# Patient Record
Sex: FEMALE | Race: AMERICAN INDIAN OR ALASKA NATIVE
[De-identification: names, ages, dates, MRNs, and addresses within clinical notes are randomized per-mention and may not be internally consistent; named-entity substitution may affect disease eponyms.]

---

## 2017-05-03 ENCOUNTER — HOSPITAL ENCOUNTER (EMERGENCY)
Dept: HOSPITAL 5 - ED | Age: 32
LOS: 1 days | Discharge: HOME | End: 2017-05-04
Payer: COMMERCIAL

## 2017-05-03 DIAGNOSIS — G43.909: ICD-10-CM

## 2017-05-03 DIAGNOSIS — Z87.11: ICD-10-CM

## 2017-05-03 DIAGNOSIS — J45.909: ICD-10-CM

## 2017-05-03 DIAGNOSIS — R10.12: Primary | ICD-10-CM

## 2017-05-03 PROCEDURE — 74020: CPT

## 2017-05-03 PROCEDURE — 83690 ASSAY OF LIPASE: CPT

## 2017-05-03 PROCEDURE — C9113 INJ PANTOPRAZOLE SODIUM, VIA: HCPCS

## 2017-05-03 PROCEDURE — 80053 COMPREHEN METABOLIC PANEL: CPT

## 2017-05-03 PROCEDURE — 85025 COMPLETE CBC W/AUTO DIFF WBC: CPT

## 2017-05-03 PROCEDURE — 84703 CHORIONIC GONADOTROPIN ASSAY: CPT

## 2017-05-03 PROCEDURE — 36415 COLL VENOUS BLD VENIPUNCTURE: CPT

## 2017-05-03 PROCEDURE — 99284 EMERGENCY DEPT VISIT MOD MDM: CPT

## 2017-05-03 PROCEDURE — 96374 THER/PROPH/DIAG INJ IV PUSH: CPT

## 2017-05-03 NOTE — EMERGENCY DEPARTMENT REPORT
HPI





- General


Time Seen by Provider: 05/03/17 23:00





- HPI


HPI: 


This is a 32-year-old -American female presents to the emergency 

department with complaint of a four-day history of intermittent upper left 

abdominal discomfort.  It is associated with some occasional nausea without 

vomiting.  She has a history of a stomach ulcer that was diagnosed in 2014 by 

endoscopy.  She is supposed to be taking Protonix for this, and iron pills for 

a history of anemia, but has not been compliant with this medication.  The 

patient was having some constipation issues so she took some magnesium oxide 

and has just recently started moving her bowels.  She took some Pepto-Bismol on 

Monday.  She said within the bowel started to move and she had some diarrhea 

that the stool seemed darker.  Otherwise she has no obvious rectal bleeding.  

Otherwise she has a past nuchal history of asthma.  She goes to [facility].  When she was seen by gastroenterology and 2014 it was Dr. Acosta.








ED Past Medical Hx





- Past Medical History


Hx Headaches / Migraines: Yes


Hx Asthma: Yes (albuterol this am. no recent attacks)


Additional medical history: Syncope, Anemia, Ulcer





- Surgical History


Additional Surgical History: pilonidal cyst removed





- Social History


Smoking Status: Never Smoker


Substance Use Type: Alcohol





- Medications


Home Medications: 


 Home Medications











 Medication  Instructions  Recorded  Confirmed  Last Taken  Type


 


ALBUTEROL Inhaler [ProAir HFA 2 inhalation INHALATION PRN 03/20/14 03/21/14 03/ 21/14 History





Inhaler]     


 


Ferrous Sulfate [Iron Supplement] 1 tab PO DAILY 03/20/14 03/21/14 03/20/14 

History


 


Fluticasone/Salmeterol [Advair 1 inhalation INHALATION PRN 03/20/14 03/21/14 03/ 21/14 History





Diskus 250-50 mcg]     


 


Metoclopramide HCl [Reglan TAB] 5 mg PO TIDAC PRN #20 tablet 12/31/15  Unknown 

Rx


 


ALBUTEROL Inhaler [ProAir HFA 2 puff IH QID PRN #1 inhalation 05/04/17  Unknown 

Rx





Inhaler]     


 


HYDROcodone/APAP 5-325 [[place] 1 each PO Q6HR PRN #12 tablet 05/04/17  Unknown Rx





5/325]     


 


Ondansetron [Zofran Odt] 4 mg PO Q8HR PRN #10 tab.rapdis 05/04/17  Unknown Rx


 


Pantoprazole [Protonix TAB] 1 tab PO DAILY #30 tablet 05/04/17  Unknown Rx














ED Review of Systems


ROS: 


Stated complaint: ABD PAIN


Other details as noted in HPI





Comment: All other systems reviewed and negative


Constitutional: denies: chills, fever


Eyes: denies: eye pain, eye discharge, vision change


ENT: denies: ear pain, throat pain


Respiratory: denies: cough, shortness of breath, wheezing


Cardiovascular: denies: chest pain, palpitations


Gastrointestinal: abdominal pain, nausea.  denies: vomiting


Genitourinary: denies: urgency, dysuria, discharge


Musculoskeletal: denies: back pain, joint swelling, arthralgia


Skin: denies: rash, lesions


Neurological: denies: headache, weakness, paresthesias





Physical Exam





- Physical Exam


Physical Exam: 


GENERAL: The patient is well-developed well-nourished.


HEENT: Normocephalic.  Atraumatic.  Extraocular motions are intact.  Patient 

has moist mucous membranes.  Pupils equal reactive to light bilaterally.


NECK: Supple.  Trachea is midline.


CHEST/LUNGS: Clear to auscultation.  There is no respiratory distress noted.


HEART/CARDIOVASCULAR: Regular.  There is no tachycardia.  There is no gallop 

rub or murmur.


ABDOMEN: Abdomen is soft, nontender.  Unable to reproduce tenderness to 

palpation.  Patient has normal bowel sounds.  There is no abdominal distention.


SKIN: Skin is warm and dry.


NEURO: The patient is awake, alert, and oriented.  The patient is cooperative.  

The patient has no focal neurologic deficits.  The patient has normal speech 

and gait.


MUSCULOSKELETAL: There is no tenderness or deformity.  There is no limitation 

range of motion.  There is no evidence of acute injury.








ED Medical Decision Making





- Lab Data


Result diagrams: 


 05/03/17 23:55





 05/03/17 23:55





- Radiology Data


Radiology results: image reviewed


interpreted by me: 


X-ray of the abdomen shows nonspecific nonobstructive bowel gas.








- Medical Decision Making


32-year-old female with a history of gastric ulcer presents with a few days of 

left upper quadrant abdominal pain.  The pain appears to worsen with eating.  

Labs are unremarkable.  Abdominal x-ray does not show any acute process.  She 

feels as if the pain is consistent with a previous gastric ulcer.  No blood 

seen on guaiac testing but there was not much stool to test.  Vital signs 

stable throughout her ED course.  She was given a GI cocktail and IV fluid and 

some Protonix.  Upon reevaluation she is feeling slightly improved.  She has a 

primary care doctor and recently saw Dr. Acosta for gastroenterology.  She was 

given a referral again for this gastroenterology service.  She will be given 

something for pain, nausea and a PPI refill.  She will return to the ER with 

any worsening of her symptoms or any acute distress.








- Differential Diagnosis


gastric ulcer, duodenal ulcer, pancreatitis, gastritis


Critical Care Time: No


Critical care attestation.: 


If time is entered above; I have spent that time in minutes in the direct care 

of this critically ill patient, excluding procedure time.








ED Disposition


Clinical Impression: 


 History of stomach ulcers





Abdominal pain


Qualifiers:


 Abdominal location: left upper quadrant Qualified Code(s): R10.12 - Left upper 

quadrant pain





Disposition: DISCHARGED TO HOME OR SELFCARE


Is pt being admited?: No


Condition: Stable


Instructions:  Abdominal Pain (ED)


Additional Instructions: 


Follow-up with your gastroenterologist as soon as possible.  Return to the 

emergency department with any worsening of her symptoms or any acute distress. 

You've been prescribed a medication that is sedating.  Therefore this 

medication cannot be mixed with alcohol, or taken prior to driving, working, or 

being responsible for children.  


Prescriptions: 


ALBUTEROL Inhaler [ProAir HFA Inhaler] 2 puff IH QID PRN #1 inhalation


 PRN Reason: Shortness Of Breath


HYDROcodone/APAP 5-325 [[place] 5/325] 1 each PO Q6HR PRN #12 tablet


 PRN Reason: Pain


Ondansetron [Zofran Odt] 4 mg PO Q8HR PRN #10 tab.rapdis


 PRN Reason: Nausea


Pantoprazole [Protonix TAB] 1 tab PO DAILY #30 tablet


Referrals: 


PRIMARY MD ELENITA [Primary Care Provider] - 3-5 Days


MARTY ESCUDERO MD [Staff Physician] - 3-5 Days


Time of Disposition: 02:13

## 2017-05-04 VITALS — DIASTOLIC BLOOD PRESSURE: 76 MMHG | SYSTOLIC BLOOD PRESSURE: 124 MMHG

## 2017-05-04 LAB
ALBUMIN SERPL-MCNC: 3.9 G/DL (ref 3.9–5)
ALBUMIN/GLOB SERPL: 1.1 %
ALP SERPL-CCNC: 50 UNITS/L (ref 35–129)
ALT SERPL-CCNC: 12 UNITS/L (ref 7–56)
ANION GAP SERPL CALC-SCNC: 17 MMOL/L
BASOPHILS NFR BLD AUTO: 0.4 % (ref 0–1.8)
BILIRUB SERPL-MCNC: 0.3 MG/DL (ref 0.1–1.2)
BUN SERPL-MCNC: 9 MG/DL (ref 7–17)
BUN/CREAT SERPL: 18 %
CALCIUM SERPL-MCNC: 8.6 MG/DL (ref 8.4–10.2)
CHLORIDE SERPL-SCNC: 97.7 MMOL/L (ref 98–107)
CO2 SERPL-SCNC: 23 MMOL/L (ref 22–30)
EOSINOPHIL NFR BLD AUTO: 2 % (ref 0–4.3)
GLUCOSE SERPL-MCNC: 89 MG/DL (ref 65–100)
HCT VFR BLD CALC: 36.3 % (ref 30.3–42.9)
HGB BLD-MCNC: 12.1 GM/DL (ref 10.1–14.3)
LIPASE SERPL-CCNC: 19 UNITS/L (ref 13–60)
MCH RBC QN AUTO: 31 PG (ref 28–32)
MCHC RBC AUTO-ENTMCNC: 34 % (ref 30–34)
MCV RBC AUTO: 91 FL (ref 79–97)
PLATELET # BLD: 189 K/MM3 (ref 140–440)
POTASSIUM SERPL-SCNC: 3.9 MMOL/L (ref 3.6–5)
PROT SERPL-MCNC: 7.6 G/DL (ref 6.3–8.2)
RBC # BLD AUTO: 3.97 M/MM3 (ref 3.65–5.03)
SODIUM SERPL-SCNC: 134 MMOL/L (ref 137–145)
WBC # BLD AUTO: 8.6 K/MM3 (ref 4.5–11)

## 2017-05-04 NOTE — XRAY REPORT
ABDOMINAL SERIES 2 VIEWS: 05/03/17 22:50:00



CLINICAL: Abdominal pain.



FINDINGS: Supine and upright views demonstrate a normal bowel gas 

pattern with a moderate volume of stool in the colon. No distended 

small bowel and no air-fluid levels. No pneumoperitoneum. No mass or 

suspicious calcifications.  The bones and soft tissues are normal.





IMPRESSION: Negative abdomen.

## 2020-01-22 ENCOUNTER — HOSPITAL ENCOUNTER (OUTPATIENT)
Dept: HOSPITAL 5 - LAB | Age: 35
Discharge: HOME | End: 2020-01-22
Attending: INTERNAL MEDICINE
Payer: COMMERCIAL

## 2020-01-22 DIAGNOSIS — Z00.00: Primary | ICD-10-CM

## 2020-01-22 DIAGNOSIS — E55.9: ICD-10-CM

## 2020-01-22 DIAGNOSIS — E78.2: ICD-10-CM

## 2020-01-22 DIAGNOSIS — R53.83: ICD-10-CM

## 2020-01-22 LAB
ALBUMIN SERPL-MCNC: 3.9 G/DL (ref 3.9–5)
ALT SERPL-CCNC: 9 UNITS/L (ref 7–56)
BASOPHILS # (AUTO): 0.1 K/MM3 (ref 0–0.1)
BASOPHILS NFR BLD AUTO: 0.8 % (ref 0–1.8)
BILIRUB UR QL STRIP: (no result)
BLOOD UR QL VISUAL: (no result)
BUN SERPL-MCNC: 11 MG/DL (ref 7–17)
BUN/CREAT SERPL: 16 %
CALCIUM SERPL-MCNC: 9.1 MG/DL (ref 8.4–10.2)
EOSINOPHIL # BLD AUTO: 0.2 K/MM3 (ref 0–0.4)
EOSINOPHIL NFR BLD AUTO: 2.3 % (ref 0–4.3)
HCT VFR BLD CALC: 35.3 % (ref 30.3–42.9)
HDLC SERPL-MCNC: 63 MG/DL (ref 40–59)
HEMOLYSIS INDEX: 3
HGB BLD-MCNC: 12.3 GM/DL (ref 10.1–14.3)
LYMPHOCYTES # BLD AUTO: 2.7 K/MM3 (ref 1.2–5.4)
LYMPHOCYTES NFR BLD AUTO: 31.7 % (ref 13.4–35)
MCHC RBC AUTO-ENTMCNC: 35 % (ref 30–34)
MCV RBC AUTO: 89 FL (ref 79–97)
MONOCYTES # (AUTO): 0.6 K/MM3 (ref 0–0.8)
MONOCYTES % (AUTO): 7.3 % (ref 0–7.3)
PH UR STRIP: 7 [PH] (ref 5–7)
PLATELET # BLD: 223 K/MM3 (ref 140–440)
PROT UR STRIP-MCNC: (no result) MG/DL
RBC # BLD AUTO: 3.97 M/MM3 (ref 3.65–5.03)
RBC #/AREA URNS HPF: 2 /HPF (ref 0–6)
UROBILINOGEN UR-MCNC: 2 MG/DL (ref ?–2)
WBC #/AREA URNS HPF: 2 /HPF (ref 0–6)

## 2020-01-22 PROCEDURE — 80053 COMPREHEN METABOLIC PANEL: CPT

## 2020-01-22 PROCEDURE — 36415 COLL VENOUS BLD VENIPUNCTURE: CPT

## 2020-01-22 PROCEDURE — 84146 ASSAY OF PROLACTIN: CPT

## 2020-01-22 PROCEDURE — 81001 URINALYSIS AUTO W/SCOPE: CPT

## 2020-01-22 PROCEDURE — 82306 VITAMIN D 25 HYDROXY: CPT

## 2020-01-22 PROCEDURE — 85025 COMPLETE CBC W/AUTO DIFF WBC: CPT

## 2020-01-22 PROCEDURE — 80061 LIPID PANEL: CPT

## 2020-01-22 PROCEDURE — 86689 HTLV/HIV CONFIRMJ ANTIBODY: CPT

## 2020-01-26 LAB — VITAMIN D2 SERPL-MCNC: <4 NG/ML

## 2020-01-27 LAB
HIV1 AB SERPL QL IB: (no result)
HIV1 AB SERPL QL IB: (no result)